# Patient Record
Sex: FEMALE | Race: WHITE | NOT HISPANIC OR LATINO | URBAN - METROPOLITAN AREA
[De-identification: names, ages, dates, MRNs, and addresses within clinical notes are randomized per-mention and may not be internally consistent; named-entity substitution may affect disease eponyms.]

---
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
## 2023-12-13 ENCOUNTER — APPOINTMENT (OUTPATIENT)
Dept: URBAN - METROPOLITAN AREA CLINIC 208 | Age: 23
Setting detail: DERMATOLOGY
End: 2023-12-18

## 2023-12-13 DIAGNOSIS — Z41.9 ENCOUNTER FOR PROCEDURE FOR PURPOSES OTHER THAN REMEDYING HEALTH STATE, UNSPECIFIED: ICD-10-CM

## 2023-12-13 PROCEDURE — OTHER ACNE SURGERY COSMETIC: OTHER

## 2023-12-13 ASSESSMENT — LOCATION SIMPLE DESCRIPTION DERM
LOCATION SIMPLE: LEFT CHEEK
LOCATION SIMPLE: INFERIOR FOREHEAD
LOCATION SIMPLE: RIGHT CHEEK

## 2023-12-13 ASSESSMENT — LOCATION DETAILED DESCRIPTION DERM
LOCATION DETAILED: RIGHT INFERIOR MEDIAL MALAR CHEEK
LOCATION DETAILED: INFERIOR MID FOREHEAD
LOCATION DETAILED: LEFT INFERIOR MEDIAL MALAR CHEEK

## 2023-12-13 ASSESSMENT — LOCATION ZONE DERM: LOCATION ZONE: FACE

## 2023-12-13 NOTE — PROCEDURE: ACNE SURGERY COSMETIC
Render Number Of Lesions Treated: no
Extraction Method: 11 blade and comedo extractor
Consent was obtained and risks were reviewed including but not limited to scarring, infection, bleeding, scabbing, incomplete removal, and allergy to anesthesia. Verbal consent given by father to treat.
Detail Level: Zone
Post-Care Instructions: I reviewed with the patient in detail post-care instructions. Patient is to keep the treatment areas dry overnight, and then apply bacitracin twice daily until healed. Patient may apply hydrogen peroxide soaks to remove any crusting.
Price (Use Numbers Only, No Special Characters Or $): 100
Prep Text (Optional): Prior to removal the treatment areas were prepped in the usual fashion. Gentle cleansing lotion, pore detox, soothing facial rinse, Elta clear
Acne Type: Comedonal Lesions

## 2024-07-09 ENCOUNTER — APPOINTMENT (OUTPATIENT)
Dept: URBAN - METROPOLITAN AREA CLINIC 208 | Age: 24
Setting detail: DERMATOLOGY
End: 2024-07-11

## 2024-07-09 DIAGNOSIS — L74.510 PRIMARY FOCAL HYPERHIDROSIS, AXILLA: ICD-10-CM

## 2024-07-09 PROCEDURE — OTHER COUNSELING: OTHER

## 2024-07-09 PROCEDURE — OTHER MIPS QUALITY: OTHER

## 2024-07-09 PROCEDURE — OTHER PRESCRIPTION MEDICATION MANAGEMENT: OTHER

## 2024-07-09 PROCEDURE — 99212 OFFICE O/P EST SF 10 MIN: CPT

## 2024-07-09 PROCEDURE — OTHER ADDITIONAL NOTES: OTHER

## 2024-07-09 PROCEDURE — OTHER PRESCRIPTION: OTHER

## 2024-07-09 RX ORDER — GLYCOPYRROLATE 1 MG/1
TABLET ORAL
Qty: 180 | Refills: 4 | Status: ERX | COMMUNITY
Start: 2024-07-09

## 2024-07-09 ASSESSMENT — LOCATION DETAILED DESCRIPTION DERM
LOCATION DETAILED: LEFT AXILLARY VAULT
LOCATION DETAILED: RIGHT AXILLARY VAULT

## 2024-07-09 ASSESSMENT — LOCATION ZONE DERM: LOCATION ZONE: AXILLAE

## 2024-07-09 ASSESSMENT — LOCATION SIMPLE DESCRIPTION DERM
LOCATION SIMPLE: RIGHT AXILLARY VAULT
LOCATION SIMPLE: LEFT AXILLARY VAULT

## 2024-07-09 NOTE — PROCEDURE: ADDITIONAL NOTES
Render Risk Assessment In Note?: no
Detail Level: Simple
Additional Notes: I counseled the patient regarding the following:\\nMira Dry Information:\\n- 2 treatments needed, First treatment costs $1950. 2nd treatment: $950. \\n- Laser uses microwave technology to heat up sweat glands to stop them from working\\n- Be prepared to be in the office for 2 hours; the first hour typically is numbing time\\n- Laser can cause hair removal; please shave underarms 2-3 days before procedure; allows us to see the hair/sweat gland distribution \\n- Overall, 80% reduction in sweating after 2 treatments. \\n\\n4-6 days BEFORE Procedure:\\n- Shave both underarms; by the time you come in for your procedure, there will be a little bit of hair growth to identify the area to be treated.\\n- If you forget to shave, we will recommend that you reschedule your procedure date. \\n\\n1 day BEFORE procedure: \\n- do not wear any deodorant or antiperspirants\\n\\nWhat to wear the DAY of the procedure:\\n- Loose and comfortable clothes. Easily cleaned top with loose arm holes\\n- For women: Tank top, regular bra (not a sports bra), camisole\\n-  For men: Tank top or T-shirt\\n\\nPost Procedure Expectations:\\n- Axillae will be sore, swollen and numb\\n- After the treatment, you may experience swelling, redness, temporary altered sensation, tingling, soreness, weakness, tight banding, pain, or bumps under the skin in the treated area and/or upper arm. This will resolve in a couple of weeks. Rarely, symptoms can last for several months. \\n- Most people have bruising for about a week \\n- You will be given ice packs after treatment and will need to ice area for 20 minutes on and then off for 24 hours. \\n- We recommend patient alternate between Tylenol and Advil if needed for pain after procedure.\\n- Massage area after treatments to prevent any lumps or banding \\n- Please replace your razor and deodorant after procedure\\n- You must keep the treated area clean (wash with water and gentle liquid soap) and wear loose fitting tops for the next few days.  Avoid shaving or applying antiperspirant/deodorant for the next few days.\\n- Avoid any rigorous activity for several days post procedure.  If you normally exercise heavily, wait a few days before resuming your exercise routine.

## 2024-07-09 NOTE — HPI: SWEATING (HYPERHIDROSIS)
Is This A New Presentation, Or A Follow-Up?: Sweating
Additional History: Has tried otc antiperspirants \\n

## 2024-07-09 NOTE — PROCEDURE: PRESCRIPTION MEDICATION MANAGEMENT
Detail Level: Zone
Plan: Discussed topical  drysol vs Qbrexa vs oral glycopyrrolate vs miradry laser treatment\\nadvised patient that aluminum is an antiperspirant so using aluminum-free deodorants do not help with sweating
Render In Strict Bullet Format?: Yes